# Patient Record
Sex: MALE | Race: WHITE | NOT HISPANIC OR LATINO | Employment: FULL TIME | URBAN - METROPOLITAN AREA
[De-identification: names, ages, dates, MRNs, and addresses within clinical notes are randomized per-mention and may not be internally consistent; named-entity substitution may affect disease eponyms.]

---

## 2017-12-03 ENCOUNTER — HOSPITAL ENCOUNTER (EMERGENCY)
Facility: CLINIC | Age: 22
Discharge: HOME | End: 2017-12-03
Attending: EMERGENCY MEDICINE | Admitting: EMERGENCY MEDICINE
Payer: COMMERCIAL

## 2017-12-03 VITALS
SYSTOLIC BLOOD PRESSURE: 153 MMHG | TEMPERATURE: 97.4 F | HEART RATE: 92 BPM | HEIGHT: 73 IN | BODY MASS INDEX: 29.16 KG/M2 | OXYGEN SATURATION: 99 % | WEIGHT: 220 LBS | RESPIRATION RATE: 18 BRPM | DIASTOLIC BLOOD PRESSURE: 73 MMHG

## 2017-12-03 DIAGNOSIS — S61.213A LACERATION OF LEFT MIDDLE FINGER WITHOUT FOREIGN BODY WITHOUT DAMAGE TO NAIL, INITIAL ENCOUNTER: ICD-10-CM

## 2017-12-03 DIAGNOSIS — S61.211A LACERATION OF LEFT INDEX FINGER WITHOUT FOREIGN BODY WITHOUT DAMAGE TO NAIL, INITIAL ENCOUNTER: ICD-10-CM

## 2017-12-03 PROCEDURE — 99283 EMERGENCY DEPT VISIT LOW MDM: CPT | Mod: 25

## 2017-12-03 PROCEDURE — 12004 RPR S/N/AX/GEN/TRK7.6-12.5CM: CPT

## 2017-12-03 RX ORDER — LIDOCAINE HYDROCHLORIDE AND EPINEPHRINE BITARTRATE 20; .01 MG/ML; MG/ML
1 INJECTION, SOLUTION SUBCUTANEOUS ONCE
Status: DISCONTINUED | OUTPATIENT
Start: 2017-12-03 | End: 2017-12-03 | Stop reason: CLARIF

## 2017-12-03 RX ORDER — LIDOCAINE HYDROCHLORIDE AND EPINEPHRINE 10; 10 MG/ML; UG/ML
5 INJECTION, SOLUTION INFILTRATION; PERINEURAL ONCE
Status: DISCONTINUED | OUTPATIENT
Start: 2017-12-03 | End: 2017-12-03 | Stop reason: HOSPADM

## 2017-12-03 RX ORDER — CEPHALEXIN 500 MG/1
500 CAPSULE ORAL 3 TIMES DAILY
Qty: 21 CAPSULE | Refills: 0 | Status: SHIPPED | OUTPATIENT
Start: 2017-12-03 | End: 2017-12-10

## 2017-12-03 NOTE — ED AVS SNAPSHOT
[facility], Emergency Department    [address] [address]    Phone:  [phone]                                       Melvin Holbrook   MRN: [id number]    Department:  [facility], Emergency Department   Date of Visit:  12/3/2017           Patient Information     Date Of Birth          1995        Your diagnoses for this visit were:     Laceration of left index finger without foreign body without damage to nail, initial encounter     Laceration of left middle finger without foreign body without damage to nail, initial encounter        You were seen by Estuardo Jackson MD.        Discharge Instructions       Keep dressings on for 2 days.    Then remove and may wash hands but keep the wounds clean and dry.  Protect wounds while at work.    Please make an appointment to follow up with Your Primary Care Provider in 10-12 days for suture removal.    Discharge References/Attachments     LACERATION, EXTREMITY: STITCHES, STAPLE, OR TAPE (ENGLISH)      24 Hour Appointment Hotline       To make an appointment at any [facility] clinic, call [phone] ([phone]). If you don't have a family doctor or clinic, we will help you find one. [facility] clinics are conveniently located to serve the needs of you and your family.             Review of your medicines      START taking        Dose / Directions Last dose taken    cephALEXin 500 MG capsule   Commonly known as:  KEFLEX   Dose:  500 mg   Quantity:  21 capsule        Take 1 capsule (500 mg) by mouth 3 times daily for 7 days   Refills:  0          Our records show that you are taking the medicines listed below. If these are incorrect, please call your family doctor or clinic.        Dose / Directions Last dose taken    NO ACTIVE MEDICATIONS        Refills:  0                Prescriptions were sent or printed at these locations (1 Prescription)                   Other Prescriptions                Printed at Department/Unit printer (1  "of 1)         cephALEXin (KEFLEX) 500 MG capsule                Procedures and tests performed during your visit     Laceration repair      Orders Needing Specimen Collection     None      Pending Results     No orders found from 2017 to 2017.            Pending Culture Results     No orders found from 2017 to 2017.            Pending Results Instructions     If you had any lab results that were not finalized at the time of your Discharge, you can call the ED Lab Result RN at [phone]. You will be contacted by this team for any positive Lab results or changes in treatment. The nurses are available 7 days a week from 10A to 6:30P.  You can leave a message 24 hours per day and they will return your call.        Thank you for choosing [facility]       Thank you for choosing [facility] for your care. Our goal is always to provide you with excellent care. Hearing back from our patients is one way we can continue to improve our services. Please take a few minutes to complete the written survey that you may receive in the mail after you visit with us. Thank you!        [employer] Information     [employer] lets you send messages to your doctor, view your test results, renew your prescriptions, schedule appointments and more. To sign up, go to [email or web address] . Click on \"Log in\" on the left side of the screen, which will take you to the Welcome page. Then click on \"Sign up Now\" on the right side of the page.     You will be asked to enter the access code listed below, as well as some personal information. Please follow the directions to create your username and password.     Your access code is: [email or web address]  Expires: 3/3/2018  1:07 PM     Your access code will  in 90 days. If you need help or a new code, please call your [facility] clinic or [phone].        Care EveryWhere ID     This is your Care EveryWhere ID. This could be used by other organizations to access your [facility] medical " records  [id number]        Equal Access to Services     RACHAEL FARIAS : Val Tracey, karan oseguera, thanh peguero, kelly lockett. So [facility] [phone].    ATENCIÓN: Si habla español, tiene a leon disposición servicios gratuitos de asistencia lingüística. Llame al [phone].    We comply with applicable federal civil rights laws and [place] laws. We do not discriminate on the basis of race, color, national origin, age, disability, sex, sexual orientation, or gender identity.            After Visit Summary       This is your record. Keep this with you and show to your community pharmacist(s) and doctor(s) at your next visit.

## 2017-12-03 NOTE — ED AVS SNAPSHOT
[facility], [place], Emergency Department    [address] [place] [address]    Phone:  [phone]                                       Melvin Holbrook   MRN: [id number]    Department:  [facility], Emergency Department   Date of Visit:  12/3/2017           After Visit Summary Signature Page     I have received my discharge instructions, and my questions have been answered. I have discussed any challenges I see with this plan with the nurse or doctor.    ..........................................................................................................................................  Patient/Patient Representative Signature      ..........................................................................................................................................  Patient Representative Print Name and Relationship to Patient    ..................................................               ................................................  Date                                            Time    ..........................................................................................................................................  Reviewed by Signature/Title    ...................................................              ..............................................  Date                                                            Time

## 2017-12-03 NOTE — DISCHARGE INSTRUCTIONS
Keep dressings on for 2 days.    Then remove and may wash hands but keep the wounds clean and dry.  Protect wounds while at work.    Please make an appointment to follow up with Your Primary Care Provider in 10-12 days for suture removal.

## 2017-12-03 NOTE — ED PROVIDER NOTES
"  History     Chief Complaint   Patient presents with     Laceration     HPI  Melvin Holbrook is a 22 year old male who sustained lacerations to his left index and middle finger while using a chisel and a 3-D printer at work yesterday.  Patient states that his last tetanus immunization was within the last 5 years.  Patient states that he stated home last night with his fingers wrapped only to discover that when unwrapping them this morning the wounds were bad enough to require sutures.  He denies any other injury and states his wounds are approximately 12 hours old.    I have reviewed the Medications, Allergies, Past Medical and Surgical History, and Social History in the Epic system.    History reviewed. No pertinent past medical history.    Previous Medications    NO ACTIVE MEDICATIONS         No Known Allergies  Social History     Social History     Marital status: Single     Spouse name: N/A     Number of children: N/A     Years of education: N/A     Occupational History     Not on file.     Social History Main Topics     Smoking status: Never Smoker     Smokeless tobacco: Never Used     Alcohol use Yes     Drug use: No     Sexual activity: Not on file     Other Topics Concern     Not on file     Social History Narrative     No narrative on file     History reviewed. No pertinent surgical history.  No family history on file.    Review of Systems   All other systems reviewed and are negative.      Physical Exam   BP: 153/73  Pulse: 91  Temp: 97.4  F (36.3  C)  Resp: 18  Height: 185.4 cm (6' 1\")  Weight: 99.8 kg (220 lb)  SpO2: 99 %      Physical Exam   Constitutional: He is oriented to person, place, and time. No distress.   HENT:   Head: Atraumatic.   Eyes: EOM are normal. Pupils are equal, round, and reactive to light.   Pulmonary/Chest: No respiratory distress.   Musculoskeletal:   Patient has no bony tenderness; distal CMS is intact in his left index and middle fingers.   Neurological: He is alert and " oriented to person, place, and time.   Grossly intact and symmetric   Skin:   Patient has lacerations to his left index and middle fingers that are flap-like in nature and total laceration length measures 11 cm   Psychiatric: He has a normal mood and affect.       ED Course     ED Course     Laceration repair  Date/Time: 12/3/2017 1:07 PM  Performed by: KRISTEN COFFEY  Authorized by: KRISTEN COFFEY   Consent: Verbal consent obtained.  Consent given by: patient  Body area: upper extremity (Left index and left long fingers)  Laceration length: 11 cm  Foreign bodies: no foreign bodies  Tendon involvement: The extensor tendon sheath in the left index finger is exposed but there is no tendon laceration.  Nerve involvement: none  Vascular damage: no  Anesthesia: local infiltration    Anesthesia:  Local Anesthetic: lidocaine 1% with epinephrine  Anesthetic total: 3 mL    Sedation:  Patient sedated: no  Preparation: Patient was prepped and draped in the usual sterile fashion.  Irrigation method: syringe  Amount of cleaning: standard  Debridement: none  Degree of undermining: none  Skin closure: 5-0 nylon  Number of sutures: 20  Technique: simple  Approximation: close  Approximation difficulty: simple  Dressing: antibiotic ointment, non-adhesive packing strip and tube gauze  Patient tolerance: Patient tolerated the procedure well with no immediate complications                  Assessments & Plan (with Medical Decision Making)     I have reviewed the nursing notes.    Dressings to be applied by nursing personnel and the patient will be sent home.  Antibiotics given because of the exposure of the tendon sheath of his left index finger.    I have reviewed the findings, diagnosis, plan and need for follow up with the patient.    New Prescriptions    CEPHALEXIN (KEFLEX) 500 MG CAPSULE    Take 1 capsule (500 mg) by mouth 3 times daily for 7 days       Final diagnoses:   Laceration of left index finger without  foreign body without damage to nail, initial encounter   Laceration of left middle finger without foreign body without damage to nail, initial encounter     Keep dressings on for 2 days.    Then remove and may wash hands but keep the wounds clean and dry.  Protect wounds while at work.    Please make an appointment to follow up with Your Primary Care Provider in 10-12 days for suture removal.      Routine discharge instructions given for this diagnosis    Estuardo Jackson MD    12/3/2017   [facility], [place], EMERGENCY DEPARTMENT     Estuardo Jackson MD  12/03/17 8379

## 2017-12-03 NOTE — ED NOTES
Patient presents ambulatory to triage from home with lacerations to index and middle fingers on the left hand. Patient reports he was using a chisel last night when he cut himself. Pt states he wrapped the fingers up but they started to bleed again this morning so he decided to come in. Dressing in place, bleeding controlled.

## 2022-12-08 ENCOUNTER — APPOINTMENT (OUTPATIENT)
Dept: URBAN - METROPOLITAN AREA CLINIC 256 | Age: 27
Setting detail: DERMATOLOGY
End: 2022-12-08

## 2022-12-08 VITALS — WEIGHT: 215 LBS | HEIGHT: 73 IN

## 2022-12-08 DIAGNOSIS — L663 OTHER SPECIFIED DISEASES OF HAIR AND HAIR FOLLICLES: ICD-10-CM

## 2022-12-08 DIAGNOSIS — Q828 OTHER SPECIFIED ANOMALIES OF SKIN: ICD-10-CM

## 2022-12-08 DIAGNOSIS — D22 MELANOCYTIC NEVI: ICD-10-CM

## 2022-12-08 DIAGNOSIS — L82.1 OTHER SEBORRHEIC KERATOSIS: ICD-10-CM

## 2022-12-08 DIAGNOSIS — L738 OTHER SPECIFIED DISEASES OF HAIR AND HAIR FOLLICLES: ICD-10-CM

## 2022-12-08 DIAGNOSIS — Q826 OTHER SPECIFIED ANOMALIES OF SKIN: ICD-10-CM

## 2022-12-08 DIAGNOSIS — Q819 OTHER SPECIFIED ANOMALIES OF SKIN: ICD-10-CM

## 2022-12-08 PROBLEM — L02.821 FURUNCLE OF HEAD [ANY PART, EXCEPT FACE]: Status: ACTIVE | Noted: 2022-12-08

## 2022-12-08 PROBLEM — D22.5 MELANOCYTIC NEVI OF TRUNK: Status: ACTIVE | Noted: 2022-12-08

## 2022-12-08 PROBLEM — L85.8 OTHER SPECIFIED EPIDERMAL THICKENING: Status: ACTIVE | Noted: 2022-12-08

## 2022-12-08 PROCEDURE — OTHER COUNSELING: OTHER

## 2022-12-08 PROCEDURE — OTHER PRESCRIPTION: OTHER

## 2022-12-08 PROCEDURE — 99203 OFFICE O/P NEW LOW 30 MIN: CPT

## 2022-12-08 PROCEDURE — OTHER MIPS QUALITY: OTHER

## 2022-12-08 PROCEDURE — OTHER REASSURANCE: OTHER

## 2022-12-08 RX ORDER — CLINDAMYCIN PHOSPHATE 10 MG/ML
SOLUTION TOPICAL
Qty: 60 | Refills: 5 | Status: ERX | COMMUNITY
Start: 2022-12-08

## 2022-12-08 ASSESSMENT — LOCATION SIMPLE DESCRIPTION DERM
LOCATION SIMPLE: POSTERIOR SCALP
LOCATION SIMPLE: LEFT UPPER ARM
LOCATION SIMPLE: LEFT UPPER BACK
LOCATION SIMPLE: RIGHT UPPER ARM

## 2022-12-08 ASSESSMENT — LOCATION ZONE DERM
LOCATION ZONE: TRUNK
LOCATION ZONE: ARM
LOCATION ZONE: SCALP

## 2022-12-08 ASSESSMENT — LOCATION DETAILED DESCRIPTION DERM
LOCATION DETAILED: LEFT SUPERIOR UPPER BACK
LOCATION DETAILED: RIGHT DISTAL POSTERIOR UPPER ARM
LOCATION DETAILED: RIGHT INFERIOR OCCIPITAL SCALP
LOCATION DETAILED: LEFT PROXIMAL POSTERIOR UPPER ARM

## 2022-12-08 NOTE — HPI: FULL BODY SKIN EXAMINATION
What Is The Reason For Today's Visit?: Full Body Skin Examination
What Is The Reason For Today's Visit? (Being Monitored For X): concerning skin lesions on an annual basis
Additional History: Patient reports no self history or family history of skin cancer. Patient has several lesions on back he would like specifically evaluated.

## 2023-12-02 ENCOUNTER — OFFICE VISIT (OUTPATIENT)
Dept: URGENT CARE | Facility: URGENT CARE | Age: 28
End: 2023-12-02
Payer: COMMERCIAL

## 2023-12-02 ENCOUNTER — HOSPITAL ENCOUNTER (EMERGENCY)
Facility: CLINIC | Age: 28
Discharge: HOME | End: 2023-12-02
Attending: EMERGENCY MEDICINE | Admitting: EMERGENCY MEDICINE
Payer: COMMERCIAL

## 2023-12-02 VITALS
BODY MASS INDEX: 28.76 KG/M2 | HEART RATE: 99 BPM | OXYGEN SATURATION: 98 % | DIASTOLIC BLOOD PRESSURE: 101 MMHG | RESPIRATION RATE: 18 BRPM | WEIGHT: 218 LBS | TEMPERATURE: 98.1 F | SYSTOLIC BLOOD PRESSURE: 152 MMHG

## 2023-12-02 VITALS
OXYGEN SATURATION: 100 % | TEMPERATURE: 98 F | HEIGHT: 73 IN | DIASTOLIC BLOOD PRESSURE: 101 MMHG | RESPIRATION RATE: 18 BRPM | WEIGHT: 218 LBS | BODY MASS INDEX: 28.89 KG/M2 | HEART RATE: 96 BPM | SYSTOLIC BLOOD PRESSURE: 165 MMHG

## 2023-12-02 DIAGNOSIS — S69.92XA INJURY OF NAIL BED OF FINGER OF LEFT HAND, INITIAL ENCOUNTER: ICD-10-CM

## 2023-12-02 DIAGNOSIS — S61.315A LACERATION OF LEFT RING FINGER WITHOUT FOREIGN BODY WITH DAMAGE TO NAIL, INITIAL ENCOUNTER: ICD-10-CM

## 2023-12-02 DIAGNOSIS — S61.218A LACERATION OF INDEX FINGER WITHOUT FOREIGN BODY WITHOUT DAMAGE TO NAIL, UNSPECIFIED LATERALITY, INITIAL ENCOUNTER: Primary | ICD-10-CM

## 2023-12-02 PROCEDURE — 99283 EMERGENCY DEPT VISIT LOW MDM: CPT

## 2023-12-02 PROCEDURE — 12001 RPR S/N/AX/GEN/TRNK 2.5CM/<: CPT | Mod: F3

## 2023-12-02 PROCEDURE — 250N000013 HC RX MED GY IP 250 OP 250 PS 637: Performed by: EMERGENCY MEDICINE

## 2023-12-02 PROCEDURE — 99207 PR NO CHARGE LOS: CPT | Performed by: FAMILY MEDICINE

## 2023-12-02 RX ORDER — CEPHALEXIN 500 MG/1
500 CAPSULE ORAL 3 TIMES DAILY
Qty: 21 CAPSULE | Refills: 0 | Status: SHIPPED | OUTPATIENT
Start: 2023-12-02 | End: 2023-12-02

## 2023-12-02 RX ORDER — CEPHALEXIN 500 MG/1
500 CAPSULE ORAL 3 TIMES DAILY
Qty: 21 CAPSULE | Refills: 0 | Status: SHIPPED | OUTPATIENT
Start: 2023-12-02

## 2023-12-02 RX ORDER — CEPHALEXIN 500 MG/1
500 CAPSULE ORAL ONCE
Status: COMPLETED | OUTPATIENT
Start: 2023-12-02 | End: 2023-12-02

## 2023-12-02 RX ADMIN — CEPHALEXIN 500 MG: 500 CAPSULE ORAL at 22:18

## 2023-12-02 ASSESSMENT — ACTIVITIES OF DAILY LIVING (ADL): ADLS_ACUITY_SCORE: 33

## 2023-12-03 NOTE — ED TRIAGE NOTES
Left ring finger laceration involving nail beds from a router bit while working in the garage. Bleeding stopped. Sent to ED from .      Triage Assessment (Adult)       Row Name 12/02/23 1987          Triage Assessment    Airway WDL WDL        Respiratory WDL    Respiratory WDL WDL        Skin Circulation/Temperature WDL    Skin Circulation/Temperature WDL X        Cardiac WDL    Cardiac WDL WDL        Peripheral/Neurovascular WDL    Peripheral Neurovascular WDL WDL        Cognitive/Neuro/Behavioral WDL    Cognitive/Neuro/Behavioral WDL WDL

## 2023-12-03 NOTE — ED NOTES
Pt discharged home with wife, discharge paperwork reviewed with pt, verbalized understanding. Pt left with all belongings.

## 2023-12-03 NOTE — ED PROVIDER NOTES
"    History     Chief Complaint:  Hand Injury       HPI   Melvin Holbrook is a 28 year old male presenting accompanied for left 4th finger injury to the nail.  Initially went to urgent care and the soonest they look at it they referred him here.  He denies any associated numbness or weakness.  States he has had a tetanus vaccine in the past 10 years.  No other injuries.  This was not work-related.  Right handed.      Independent Historian:    Patient    Review of External Notes:  No      Medications:    NO ACTIVE MEDICATIONS        Past Medical History:    Past Medical History:   Diagnosis Date    TBI (traumatic brain injury) (H) 02/2023       Past Surgical History:    No past surgical history on file.       Physical Exam   Patient Vitals for the past 24 hrs:   BP Temp Temp src Pulse Resp SpO2 Height Weight   12/02/23 1833 (!) 165/101 98  F (36.7  C) Temporal 96 18 100 % 1.854 m (6' 1\") 98.9 kg (218 lb)        Physical Exam  Resp:  Non-labored  Neuro:  Alert and cooperative  MSkel:  Moving all extremities  Skin:  Nail bed injury with missing portion of nail left 4th finger, proximal nail is intact, small area of deep gaping, probes to bone      Emergency Department Course     Imaging:  No orders to display     Laboratory:  Labs Ordered and Resulted from Time of ED Arrival to Time of ED Departure - No data to display     Procedures   Procedure: Laceration Repair      LACERATION:  A 0.8 cm laceration.  LOCATION:  L 4th distal phalanx  FUNCTION:  Distally sensation, circulation and motor are intact.  ANESTHESIA:  Digital block using lidocaine 1% without epinephrine total of 1 mLs; additional 0.5mL local instillation later  PREPARATION:  Irrigation with copious normal saline  DEBRIDEMENT:  Wound explored, no foreign body found.  Distal damaged portion of nail removed.  Proximal intact portion left in place.  CLOSURE:  Wound was closed with One Layer.  Skin closed with five 5-0 fast absorbing gut suture(s) using " interrupted technique.  Bacitracin + dressing applied afterwards.      Emergency Department Course & Assessments:             Interventions:  Medications   cephALEXin (KEFLEX) capsule 500 mg (500 mg Oral $Given 12/2/23 2569)        Medical Decision Making:  Tetanus is up to date per patient report. The wound was carefully evaluated and explored.  The laceration was closed with as noted above.  There is sufficient tissue to cover the bone.  There is no evidence of muscular, tendon, or bony damage with this laceration.  No signs of foreign body.  Likely to have deformity in nail where it grows over this location in the future.  Given wound probing to bone, antibiotic prophylaxis started.  Follow-up with [facility] this week; voicemail referral left.  All sutures are absorbable.    Diagnosis:    ICD-10-CM    1. Injury of nail bed of finger of left hand, initial encounter  S69.92XA       2. Laceration of left ring finger without foreign body with damage to nail, initial encounter  S61.315A             Shama Arvizu MD  12/03/23 1980

## 2023-12-03 NOTE — DISCHARGE INSTRUCTIONS
Change dressing one or twice a day as needed if it has bleed through.  Wash hands thoroughly before changing dressing.  Apply antibiotic ointment first.    Return immediately if concerned for infection (red, swollen, draining pus)    Keep dry and covered

## 2024-02-17 ENCOUNTER — HEALTH MAINTENANCE LETTER (OUTPATIENT)
Age: 29
End: 2024-02-17

## 2025-03-09 ENCOUNTER — HEALTH MAINTENANCE LETTER (OUTPATIENT)
Age: 30
End: 2025-03-09